# Patient Record
Sex: MALE | Race: WHITE | NOT HISPANIC OR LATINO | URBAN - METROPOLITAN AREA
[De-identification: names, ages, dates, MRNs, and addresses within clinical notes are randomized per-mention and may not be internally consistent; named-entity substitution may affect disease eponyms.]

---

## 2017-01-19 ENCOUNTER — EMERGENCY (EMERGENCY)
Facility: HOSPITAL | Age: 52
LOS: 1 days | Discharge: HOME | End: 2017-01-19
Attending: EMERGENCY MEDICINE | Admitting: EMERGENCY MEDICINE
Payer: COMMERCIAL

## 2017-01-19 VITALS
SYSTOLIC BLOOD PRESSURE: 137 MMHG | HEART RATE: 71 BPM | DIASTOLIC BLOOD PRESSURE: 91 MMHG | RESPIRATION RATE: 18 BRPM | OXYGEN SATURATION: 100 % | TEMPERATURE: 98 F

## 2017-01-19 VITALS
RESPIRATION RATE: 16 BRPM | SYSTOLIC BLOOD PRESSURE: 113 MMHG | DIASTOLIC BLOOD PRESSURE: 71 MMHG | OXYGEN SATURATION: 100 % | HEART RATE: 69 BPM

## 2017-01-19 DIAGNOSIS — R07.89 OTHER CHEST PAIN: ICD-10-CM

## 2017-01-19 LAB
ALBUMIN SERPL ELPH-MCNC: 4.9 G/DL — SIGNIFICANT CHANGE UP (ref 3.3–5)
ALP SERPL-CCNC: 59 U/L — SIGNIFICANT CHANGE UP (ref 40–120)
ALT FLD-CCNC: 81 U/L RC — HIGH (ref 10–45)
ANION GAP SERPL CALC-SCNC: 15 MMOL/L — SIGNIFICANT CHANGE UP (ref 5–17)
AST SERPL-CCNC: 32 U/L — SIGNIFICANT CHANGE UP (ref 10–40)
BASOPHILS # BLD AUTO: 0 K/UL — SIGNIFICANT CHANGE UP (ref 0–0.2)
BASOPHILS NFR BLD AUTO: 0.5 % — SIGNIFICANT CHANGE UP (ref 0–2)
BILIRUB SERPL-MCNC: 0.5 MG/DL — SIGNIFICANT CHANGE UP (ref 0.2–1.2)
BUN SERPL-MCNC: 21 MG/DL — SIGNIFICANT CHANGE UP (ref 7–23)
CALCIUM SERPL-MCNC: 9.9 MG/DL — SIGNIFICANT CHANGE UP (ref 8.4–10.5)
CHLORIDE SERPL-SCNC: 102 MMOL/L — SIGNIFICANT CHANGE UP (ref 96–108)
CK MB CFR SERPL CALC: 2.4 NG/ML — SIGNIFICANT CHANGE UP (ref 0–6.7)
CK SERPL-CCNC: 181 U/L — SIGNIFICANT CHANGE UP (ref 30–200)
CO2 SERPL-SCNC: 24 MMOL/L — SIGNIFICANT CHANGE UP (ref 22–31)
CREAT SERPL-MCNC: 1.35 MG/DL — HIGH (ref 0.5–1.3)
EOSINOPHIL # BLD AUTO: 0.1 K/UL — SIGNIFICANT CHANGE UP (ref 0–0.5)
EOSINOPHIL NFR BLD AUTO: 0.9 % — SIGNIFICANT CHANGE UP (ref 0–6)
GLUCOSE SERPL-MCNC: 108 MG/DL — HIGH (ref 70–99)
HCT VFR BLD CALC: 42.5 % — SIGNIFICANT CHANGE UP (ref 39–50)
HGB BLD-MCNC: 15.3 G/DL — SIGNIFICANT CHANGE UP (ref 13–17)
LYMPHOCYTES # BLD AUTO: 1.9 K/UL — SIGNIFICANT CHANGE UP (ref 1–3.3)
LYMPHOCYTES # BLD AUTO: 29 % — SIGNIFICANT CHANGE UP (ref 13–44)
MAGNESIUM SERPL-MCNC: 2.4 MG/DL — SIGNIFICANT CHANGE UP (ref 1.6–2.6)
MCHC RBC-ENTMCNC: 31.6 PG — SIGNIFICANT CHANGE UP (ref 27–34)
MCHC RBC-ENTMCNC: 36.1 GM/DL — HIGH (ref 32–36)
MCV RBC AUTO: 87.6 FL — SIGNIFICANT CHANGE UP (ref 80–100)
MONOCYTES # BLD AUTO: 0.5 K/UL — SIGNIFICANT CHANGE UP (ref 0–0.9)
MONOCYTES NFR BLD AUTO: 7.5 % — SIGNIFICANT CHANGE UP (ref 2–14)
NEUTROPHILS # BLD AUTO: 4 K/UL — SIGNIFICANT CHANGE UP (ref 1.8–7.4)
NEUTROPHILS NFR BLD AUTO: 62.1 % — SIGNIFICANT CHANGE UP (ref 43–77)
PHOSPHATE SERPL-MCNC: 2.7 MG/DL — SIGNIFICANT CHANGE UP (ref 2.5–4.5)
PLATELET # BLD AUTO: 193 K/UL — SIGNIFICANT CHANGE UP (ref 150–400)
POTASSIUM SERPL-MCNC: 4.4 MMOL/L — SIGNIFICANT CHANGE UP (ref 3.5–5.3)
POTASSIUM SERPL-SCNC: 4.4 MMOL/L — SIGNIFICANT CHANGE UP (ref 3.5–5.3)
PROT SERPL-MCNC: 7.9 G/DL — SIGNIFICANT CHANGE UP (ref 6–8.3)
RBC # BLD: 4.85 M/UL — SIGNIFICANT CHANGE UP (ref 4.2–5.8)
RBC # FLD: 12.6 % — SIGNIFICANT CHANGE UP (ref 10.3–14.5)
SODIUM SERPL-SCNC: 141 MMOL/L — SIGNIFICANT CHANGE UP (ref 135–145)
TROPONIN T SERPL-MCNC: <0.01 NG/ML — SIGNIFICANT CHANGE UP (ref 0–0.06)
WBC # BLD: 6.4 K/UL — SIGNIFICANT CHANGE UP (ref 3.8–10.5)
WBC # FLD AUTO: 6.4 K/UL — SIGNIFICANT CHANGE UP (ref 3.8–10.5)

## 2017-01-19 PROCEDURE — 82550 ASSAY OF CK (CPK): CPT

## 2017-01-19 PROCEDURE — 76705 ECHO EXAM OF ABDOMEN: CPT | Mod: 26,RT

## 2017-01-19 PROCEDURE — 71046 X-RAY EXAM CHEST 2 VIEWS: CPT

## 2017-01-19 PROCEDURE — 71020: CPT | Mod: 26

## 2017-01-19 PROCEDURE — 76705 ECHO EXAM OF ABDOMEN: CPT

## 2017-01-19 PROCEDURE — 80053 COMPREHEN METABOLIC PANEL: CPT

## 2017-01-19 PROCEDURE — 93005 ELECTROCARDIOGRAM TRACING: CPT

## 2017-01-19 PROCEDURE — 84484 ASSAY OF TROPONIN QUANT: CPT

## 2017-01-19 PROCEDURE — 99285 EMERGENCY DEPT VISIT HI MDM: CPT | Mod: 25

## 2017-01-19 PROCEDURE — 85027 COMPLETE CBC AUTOMATED: CPT

## 2017-01-19 PROCEDURE — 83735 ASSAY OF MAGNESIUM: CPT

## 2017-01-19 PROCEDURE — 93010 ELECTROCARDIOGRAM REPORT: CPT

## 2017-01-19 PROCEDURE — 84100 ASSAY OF PHOSPHORUS: CPT

## 2017-01-19 PROCEDURE — 82553 CREATINE MB FRACTION: CPT

## 2017-01-19 PROCEDURE — 99284 EMERGENCY DEPT VISIT MOD MDM: CPT | Mod: 25

## 2017-01-19 RX ORDER — SODIUM CHLORIDE 9 MG/ML
1000 INJECTION INTRAMUSCULAR; INTRAVENOUS; SUBCUTANEOUS ONCE
Qty: 0 | Refills: 0 | Status: COMPLETED | OUTPATIENT
Start: 2017-01-19 | End: 2017-01-19

## 2017-01-19 RX ADMIN — SODIUM CHLORIDE 2000 MILLILITER(S): 9 INJECTION INTRAMUSCULAR; INTRAVENOUS; SUBCUTANEOUS at 08:25

## 2017-01-19 NOTE — ED PROVIDER NOTE - MEDICAL DECISION MAKING DETAILS
51M no past medical history presents with intermittent RUQ pain/R sided chest discomfort for the past 1-2 months. Last stress/angio was about 5 yr ago and normal. ECG last week was normal. PE s/f ctab, rrr, abd ntnd. Will obtain blood, ecg, cxr, and US of RUQ. Itz Abel, Resident.

## 2017-01-19 NOTE — ED ADULT NURSE NOTE - OBJECTIVE STATEMENT
pt 50 y/o male A&Ox3 walked into the ED with c/o chest tightness and palpitations that started last night and continued this morning. pt states chest tightness and palpitations last a few minutes and then disappears. pt reports sharp RUQ pain with no radiation after eating as well that has been going on for about a month. pt reports +nausea +sweating. pt denies SOB, vomiting, chills, fever. pt reports last stress test about 5 years ago and ultrasound of gallbladder about 7 years ago. lung sounds clear b/l, normal S1 S2, abdomen soft, non distended but tenderness noted to RUQ on palpation. skin warm and dry. pt resting with wife at bedside. VSS. pt 52 y/o male A&Ox3 walked into the ED with c/o chest tightness and palpitations that started last night and continued this morning. pt states chest tightness and palpitations last a few minutes and then disappears. pt reports sharp RUQ pain with no radiation after eating as well that has been going on for about a month. pt reports +nausea +sweating. pt denies SOB, vomiting, chills, fever. pt reports last stress test about 5 years ago and ultrasound of gallbladder about 7 years ago. lung sounds clear b/l, normal S1 S2, abdomen soft, non distended but tenderness noted to RUQ on palpation. skin warm and dry. pt resting on CM with wife at bedside. VSS.

## 2017-01-19 NOTE — ED PROVIDER NOTE - OBJECTIVE STATEMENT
51M no past medical history presents with biliary colic and intermittent chest tightness. Pain is R-sided, moderate, non-radiating, lasts a few minutes a/w nausea and diaphoresis. Also endorsing "skipped" heart beats. Last stress test was 5 yr ago and angiogram that was "normal"; last EKG was last week. Had a negative US gallbladder 7 yr ago.      PMD: Goldberg, Joel

## 2017-01-19 NOTE — ED PROVIDER NOTE - INTERPRETATION
normal sinus rhythm, Normal axis, Normal OR interval and QRS complex. There are no acute ischemic ST or T-wave changes.

## 2017-01-19 NOTE — ED PROVIDER NOTE - PHYSICAL EXAMINATION
Ramirez:  General: No distress.  Mentation at baseline.   HEENT: WNL  Chest/Lungs: CTAB, No wheeze, No retractions, No increased work of breathing, Normal rate  Heart: S1S2 RRR, No M/R/G, Pules equal Bilaterally in upper and lower extremities distally  Abd: soft, tender in RUQ ND, No guarding, No rebound.  No hernias, no palpable masses.  Extrem: FROM in all joints, no significant edema noted, No ulcers.  Cap refil < 2sec.  Skin: No rash noted, warm dry.  Neuro:  Grossly normal.  No difficulty ambulating. No focal deficits.  Psychiatric: No evidence of delusions. No SI/HI.

## 2017-03-20 ENCOUNTER — EMERGENCY (EMERGENCY)
Facility: HOSPITAL | Age: 52
LOS: 1 days | Discharge: HOME | End: 2017-03-20
Attending: EMERGENCY MEDICINE | Admitting: EMERGENCY MEDICINE
Payer: COMMERCIAL

## 2017-03-20 VITALS
DIASTOLIC BLOOD PRESSURE: 93 MMHG | OXYGEN SATURATION: 98 % | SYSTOLIC BLOOD PRESSURE: 151 MMHG | HEART RATE: 83 BPM | RESPIRATION RATE: 16 BRPM | TEMPERATURE: 99 F
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
## 2017-03-20 DIAGNOSIS — R51 HEADACHE: ICD-10-CM

## 2017-03-20 DIAGNOSIS — Z87.891 PERSONAL HISTORY OF NICOTINE DEPENDENCE: ICD-10-CM
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
## 2017-03-20 DIAGNOSIS — S06.0X0A CONCUSSION WITHOUT LOSS OF CONSCIOUSNESS, INITIAL ENCOUNTER: ICD-10-CM
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
## 2017-03-20 PROCEDURE — 99283 EMERGENCY DEPT VISIT LOW MDM: CPT

## 2017-03-20 PROCEDURE — 99284 EMERGENCY DEPT VISIT MOD MDM: CPT

## 2017-03-20 NOTE — ED PROVIDER NOTE - MEDICAL DECISION MAKING DETAILS
Likely Concussion. Doubt ICH given h/p. Patient refused pain medications. Will d/c home with close neuro f/u. Likely Concussion. Doubt ICH given h/p. Patient refused pain medications. Will d/c home with close neuro f/u.  Alberto Carreno DO; see attending attestation

## 2017-03-20 NOTE — ED ADULT NURSE NOTE - OBJECTIVE STATEMENT
51 year old male with co headache  since Friday after bumping head on cabinet no LOC no open wound pt states he was feeling HA after injury and photophobia nausea no vomiting  PERRL 3 equal strength upper and extremities no cp sob seen by MD will continue to monitor

## 2017-03-20 NOTE — ED PROVIDER NOTE - PLAN OF CARE
You were seen in the ER for a concussion. You must follow up with a neurologist in 24 to 48 hours. The number for the neurology clinic is [phone].  You must follow up with your primary physician in 24 to 48 hours. Return to the ER for any new or worsening signs/symptoms.   1) Take ibuprofen for the headache as indicated on the label.

## 2017-03-20 NOTE — ED PROVIDER NOTE - OBJECTIVE STATEMENT
51 M here for headache. Friday night bumped front of head on cabinet, 1 hour later noticed vision change ("flashing pattern in vision with eyes open and closed"), saturday felt lightheaded with photophobia. No emesis, slight nausea Friday and Saturday but none now. Has had headache since saturday, mostly on L frontal, intermittent, mild, has not tried medication for headache.

## 2017-03-20 NOTE — ED PROVIDER NOTE - CARE PLAN
Principal Discharge DX:	Concussion  Instructions for follow-up, activity and diet:	You were seen in the ER for a concussion. You must follow up with a neurologist in 24 to 48 hours. The number for the neurology clinic is [phone].  You must follow up with your primary physician in 24 to 48 hours. Return to the ER for any new or worsening signs/symptoms.   1) Take ibuprofen for the headache as indicated on the label. Principal Discharge DX:	Concussion  Instructions for follow-up, activity and diet:	You were seen in the ER for a concussion. You must follow up with a neurologist in 24 to 48 hours. The number for the neurology clinic is [phone].  You must follow up with your primary physician in 24 to 48 hours. Return to the ER for any new or worsening signs/symptoms.   1) Take ibuprofen for the headache as indicated on the label. Principal Discharge DX:	Concussion  Instructions for follow-up, activity and diet:	You were seen in the ER for a concussion. You must follow up with a neurologist in 24 to 48 hours. The number for the neurology clinic is [phone].  You must follow up with your primary physician in 24 to 48 hours. Return to the ER for any new or worsening signs/symptoms.   1) Take ibuprofen for the headache as indicated on the label.

## 2017-03-20 NOTE — ED PROVIDER NOTE - ATTENDING CONTRIBUTION TO CARE
51yoM with minor head trauma, no concerning features for ICH.   On exam, neurologically intact, skin intact.   A: Closed Head Trauma with concussive symptoms.  P: I had a lengthy discussion with patient and spouse regarding diagnosis and no evidence to support further testing/imaging at this time, avoidance of repeat trauma, warning signs to return.

## 2017-05-01 ENCOUNTER — EMERGENCY (EMERGENCY)
Facility: HOSPITAL | Age: 52
LOS: 1 days | Discharge: HOME | End: 2017-05-01
Attending: EMERGENCY MEDICINE | Admitting: EMERGENCY MEDICINE
Payer: COMMERCIAL

## 2017-05-01 VITALS
HEART RATE: 61 BPM | RESPIRATION RATE: 18 BRPM | OXYGEN SATURATION: 98 % | SYSTOLIC BLOOD PRESSURE: 131 MMHG | DIASTOLIC BLOOD PRESSURE: 79 MMHG

## 2017-05-01 VITALS
HEART RATE: 72 BPM | TEMPERATURE: 99 F | DIASTOLIC BLOOD PRESSURE: 81 MMHG | RESPIRATION RATE: 18 BRPM | SYSTOLIC BLOOD PRESSURE: 155 MMHG | OXYGEN SATURATION: 100 %

## 2017-05-01 DIAGNOSIS — H53.2 DIPLOPIA: ICD-10-CM

## 2017-05-01 DIAGNOSIS — R00.2 PALPITATIONS: ICD-10-CM

## 2017-05-01 DIAGNOSIS — R51 HEADACHE: ICD-10-CM

## 2017-05-01 PROCEDURE — 99283 EMERGENCY DEPT VISIT LOW MDM: CPT | Mod: 25

## 2017-05-01 PROCEDURE — 80048 BASIC METABOLIC PNL TOTAL CA: CPT

## 2017-05-01 PROCEDURE — 85027 COMPLETE CBC AUTOMATED: CPT

## 2017-05-01 PROCEDURE — 93010 ELECTROCARDIOGRAM REPORT: CPT

## 2017-05-01 PROCEDURE — 93005 ELECTROCARDIOGRAM TRACING: CPT

## 2017-05-01 PROCEDURE — 99284 EMERGENCY DEPT VISIT MOD MDM: CPT | Mod: 25

## 2017-05-01 NOTE — ED PROVIDER NOTE - EYES, MLM
Clear bilaterally, pupils equal, round and reactive to light. Clear bilaterally, pupils equal, round and reactive to light. EOM INTACT

## 2017-05-01 NOTE — ED ADULT NURSE NOTE - OBJECTIVE STATEMENT
Pt c/o double vision and heart palpitation and some chest discomfort with no . Pt also c/o light sensitivity. Pt stated he also had a concussion 6 weeks ago.

## 2017-05-01 NOTE — ED PROVIDER NOTE - OBJECTIVE STATEMENT
51yoM no PMH, concussion over a month ago presents with palpitations x 3 days and diplopia today.  Pt noted palpitations over the weekend, feels fluttering in his chest, cannot identify any predisposing actions, of note has not had a drink since Oct and had a beer Friday night and a glass of wine Saturday night.  Today patient was at work and noted a "funny feeling in his head, like a phase shift" and had some double vision, this has never happened to him before.  He is now having some light sensitivity, but denies any headache per se, has some pressure in the front of his head.  Denies any recent fevers/chills, denies CP or SOB but endorses some right sided costochondritis history.  Denies any recent travel or sick contacts.    Of note patient takes a number of herbal supplements, like cumin extract.

## 2017-05-01 NOTE — ED PROVIDER NOTE - CARE PLAN
Principal Discharge DX:	Palpitations Principal Discharge DX:	Palpitations  Goal:	follow up with cardiology  Instructions for follow-up, activity and diet:	Please follow up with your PCP and Cardiologist Principal Discharge DX:	Palpitations  Goal:	follow up with cardiology  Instructions for follow-up, activity and diet:	Please follow up with your PCP and Cardiologist and neurologist

## 2017-05-01 NOTE — ED PROVIDER NOTE - ATTENDING CONTRIBUTION TO CARE
I performed a face to face evaluation of this patient and performed a history and physical examination on the patient.  I agree with the resident's history, physical examination, and plan of the patient. patient complaining of double vision, 1 episdoe today while working on computer screen.  also complaining of palpitations x 3 days, intermittent.  h/o concusion approx 6 weeks ago.  wears corrective lenses at baseline.  has had palpitations previously, worked up, has not had holter monitor but was in discussion with his cardiologist.  patient well appearing, eomi.  steady gait.  will check electrolytes, ekg, reassess.

## 2017-05-01 NOTE — ED PROVIDER NOTE - PLAN OF CARE
follow up with cardiology Please follow up with your PCP and Cardiologist Please follow up with your PCP and Cardiologist and neurologist

## 2017-05-01 NOTE — ED PROVIDER NOTE - MEDICAL DECISION MAKING DETAILS
51yoM no PMH, recent concussion, palpitations over the weekend, double vision while on computer likely eye fatigue, no focal neuro signs, 202/20 visual acuity, will asses basic labs and EKG

## 2017-05-01 NOTE — ED PROVIDER NOTE - PROGRESS NOTE DETAILS
Attending Note (Iva): Labs reviewed and negative for acute pathology. EKG negative for acute pathology.  well appearing.  diplopia likely due to computer screen use.  will dc with follow up with his neurologist. Discussed case with primary neur Discussed case with primary neurologist Dr. Aceves, he is comfortable with the plan to discharge and follow up in the office tomorrow, he would just like him to take a baby aspirin when he gets home.

## 2018-03-20 NOTE — ED PROVIDER NOTE - CARDIAC, MLM
Detail Level: Detailed
Normal rate, regular rhythm.  Heart sounds S1, S2.  No murmurs, rubs or gallops.

## 2021-05-09 NOTE — ED ADULT TRIAGE NOTE - AS TEMP SITE
PT BIBRA 99 FROM HOME C/O ANXIETY ATTACK "HE'S SCREAMING AND CRYING AFTER 
TAKING CANABIS AND GHB" PT IS AAOX3, NOT IN RESPIRATORY DISTRESS, V/S STABLE, 
KEPT RESTED AND COMFORTABLE. WILL CONTINUE TO MONITOR. oral

## 2023-09-09 NOTE — ED ADULT NURSE NOTE - INTEGUMENTARY WDL
Color consistent with ethnicity/race, warm, dry intact, resilient.
